# Patient Record
Sex: MALE | Race: WHITE | NOT HISPANIC OR LATINO | Employment: FULL TIME | URBAN - METROPOLITAN AREA
[De-identification: names, ages, dates, MRNs, and addresses within clinical notes are randomized per-mention and may not be internally consistent; named-entity substitution may affect disease eponyms.]

---

## 2018-03-23 ENCOUNTER — OFFICE VISIT (OUTPATIENT)
Dept: INTERNAL MEDICINE | Facility: CLINIC | Age: 33
End: 2018-03-23
Payer: COMMERCIAL

## 2018-03-23 VITALS
OXYGEN SATURATION: 95 % | BODY MASS INDEX: 35.34 KG/M2 | WEIGHT: 260.9 LBS | HEART RATE: 59 BPM | SYSTOLIC BLOOD PRESSURE: 141 MMHG | DIASTOLIC BLOOD PRESSURE: 84 MMHG | HEIGHT: 72 IN

## 2018-03-23 DIAGNOSIS — Z82.49 FAMILY HISTORY OF ISCHEMIC HEART DISEASE: ICD-10-CM

## 2018-03-23 DIAGNOSIS — F33.1 MAJOR DEPRESSIVE DISORDER, RECURRENT EPISODE, MODERATE (H): ICD-10-CM

## 2018-03-23 DIAGNOSIS — F41.1 GENERALIZED ANXIETY DISORDER: Primary | ICD-10-CM

## 2018-03-23 DIAGNOSIS — Z82.49 FAMILY HISTORY OF MYOCARDIAL INFARCTION AT AGE LESS THAN 60: ICD-10-CM

## 2018-03-23 LAB
CHOLEST SERPL-MCNC: 187 MG/DL
CRP SERPL HS-MCNC: 11.7 MG/L
HDLC SERPL-MCNC: 32 MG/DL
INTERPRETATION ECG - MUSE: NORMAL
LDLC SERPL CALC-MCNC: 109 MG/DL
NONHDLC SERPL-MCNC: 155 MG/DL
TRIGL SERPL-MCNC: 231 MG/DL

## 2018-03-23 RX ORDER — CITALOPRAM HYDROBROMIDE 20 MG/1
TABLET ORAL
Qty: 30 TABLET | Refills: 3 | Status: SHIPPED | OUTPATIENT
Start: 2018-03-23 | End: 2018-07-24

## 2018-03-23 ASSESSMENT — ANXIETY QUESTIONNAIRES
1. FEELING NERVOUS, ANXIOUS, OR ON EDGE: NEARLY EVERY DAY
IF YOU CHECKED OFF ANY PROBLEMS ON THIS QUESTIONNAIRE, HOW DIFFICULT HAVE THESE PROBLEMS MADE IT FOR YOU TO DO YOUR WORK, TAKE CARE OF THINGS AT HOME, OR GET ALONG WITH OTHER PEOPLE: VERY DIFFICULT
2. NOT BEING ABLE TO STOP OR CONTROL WORRYING: MORE THAN HALF THE DAYS
7. FEELING AFRAID AS IF SOMETHING AWFUL MIGHT HAPPEN: NEARLY EVERY DAY
3. WORRYING TOO MUCH ABOUT DIFFERENT THINGS: MORE THAN HALF THE DAYS
6. BECOMING EASILY ANNOYED OR IRRITABLE: MORE THAN HALF THE DAYS
5. BEING SO RESTLESS THAT IT IS HARD TO SIT STILL: MORE THAN HALF THE DAYS
GAD7 TOTAL SCORE: 17

## 2018-03-23 ASSESSMENT — PATIENT HEALTH QUESTIONNAIRE - PHQ9: 5. POOR APPETITE OR OVEREATING: NEARLY EVERY DAY

## 2018-03-23 ASSESSMENT — PAIN SCALES - GENERAL: PAINLEVEL: NO PAIN (0)

## 2018-03-23 NOTE — MR AVS SNAPSHOT
After Visit Summary   3/23/2018    Valdez Wheeler    MRN: [id number]           Patient Information     Date Of Birth          1985        Visit Information        Provider Department      3/23/2018 7:00 AM Supa Platt MD [facility]        Today's Diagnoses     Generalized anxiety disorder    -  1    Family history of ischemic heart disease        Family history of myocardial infarction at age less than 60        Major depressive disorder, recurrent episode, moderate (H)          Care Instructions    Primary Care Center: [phone]     Primary Care Center Medication Refill Request Information:  * Please contact your pharmacy regarding ANY request for medication refills.  ** [facility] Prescription Fax = [phone]  * Please allow 3 business days for routine medication refills.  * Please allow 5 business days for controlled substance medication refills.     Primary Care Center Test Result notification information:  *You will be notified with in 7-10 days of your appointment day regarding the results of your test.  If you are on MyChart you will be notified as soon as the provider has reviewed the results and signed off on them.          Follow-ups after your visit        Follow-up notes from your care team     Return in about 1 month (around 4/23/2018).      Your next 10 appointments already scheduled     Mar 23, 2018  7:45 AM CDT   LAB with  LAB   [facility] ([facility])    [address] [place] [id number] [phone]           Please do not eat 10-12 hours before your appointment if you are coming in fasting for labs on lipids, cholesterol, or glucose (sugar). This does not apply to pregnant women. Water, hot tea and black coffee (with nothing added) are okay. Do not drink other fluids, diet soda or chew gum.              Future tests that were ordered for you today     Open Future Orders        Priority Expected Expires  Ordered    Lipid panel reflex to direct LDL Fasting Routine 3/23/2018 3/23/2019 3/23/2018    CRP cardiac risk Routine 3/23/2018 3/23/2019 3/23/2018            Who to contact     Please call your clinic at [phone] to:    Ask questions about your health    Make or cancel appointments    Discuss your medicines    Learn about your test results    Speak to your doctor            Additional Information About Your Visit        MyChart Information     Brenco is an electronic gateway that provides easy, online access to your medical records. With Brenco, you can request a clinic appointment, read your test results, renew a prescription or communicate with your care team.     To sign up for Brenco visit the website at [email or web address]   You will be asked to enter the access code listed below, as well as some personal information. Please follow the directions to create your username and password.     Your access code is: [email or web address]  Expires: 2018  6:30 AM     Your access code will  in 90 days. If you need help or a new code, please contact your [facility] or call [phone] for assistance.        Care EveryWhere ID     This is your Care EveryWhere ID. This could be used by other organizations to access your [place] medical records  [id number]        Your Vitals Were     Pulse Height Pulse Oximetry BMI (Body Mass Index)          59 1.829 m (6') 95% 35.38 kg/m2         Blood Pressure from Last 3 Encounters:   18 141/84   16 134/90   10/29/15 (!) 135/92    Weight from Last 3 Encounters:   18 118.3 kg (260 lb 14.4 oz)   16 114.5 kg (252 lb 6.4 oz)   10/29/15 113.4 kg (250 lb)              We Performed the Following     EKG Performed in Clinic w/ Provider Reading Fee          Today's Medication Changes          These changes are accurate as of 3/23/18  7:41 AM.  If you have any questions, ask your nurse or doctor.               Start taking  these medicines.        Dose/Directions    citalopram 20 MG tablet   Commonly known as:  celeXA   Used for:  Generalized anxiety disorder, Major depressive disorder, recurrent episode, moderate (H)   Started by:  Supa Platt MD        Take 1/2 tablet (10mg) daily for 7 days and then increase to 1 tablet (20mg)   Quantity:  30 tablet   Refills:  3            Where to get your medicines      These medications were sent to [facility]/pharmacy #[id number] - [place], [place] - [address]  [address] [facility] [place] [address]     Phone:  [phone]     citalopram 20 MG tablet                Primary Care Provider Office Phone # Fax #    Miguel Ángel Martinez [phone]       [address]  [facility] [address]        Equal Access to Services     [facility] : Hadii bhupinder deal Sobrandy, waaxda luqadaha, qaybta kaalmada adehuy, aroldo valencia . So [facility] [phone].    ATENCIÓN: Si habla español, tiene a conklin disposición servicios gratuitos de asistencia lingüística. [facility] [phone].    We comply with applicable federal civil rights laws and [place] laws. We do not discriminate on the basis of race, color, national origin, age, disability, sex, sexual orientation, or gender identity.            Thank you!     Thank you for choosing [facility]  for your care. Our goal is always to provide you with excellent care. Hearing back from our patients is one way we can continue to improve our services. Please take a few minutes to complete the written survey that you may receive in the mail after your visit with us. Thank you!             Your Updated Medication List - Protect others around you: Learn how to safely use, store and throw away your medicines at www.disposemymeds.org.          This list is accurate as of 3/23/18  7:41 AM.  Always use your most recent med list.                   Brand Name Dispense Instructions for use Diagnosis     citalopram 20 MG tablet    celeXA    30 tablet    Take 1/2 tablet (10mg) daily for 7 days and then increase to 1 tablet (20mg)    Generalized anxiety disorder, Major depressive disorder, recurrent episode, moderate (H)       sertraline 50 MG tablet    ZOLOFT    30 tablet    Take 1.5 tablets (75 mg) by mouth daily    Recurrent major depressive disorder, remission status unspecified (H)

## 2018-03-23 NOTE — PROGRESS NOTES
Rooming Note  Health Maintenance   There are no preventive care reminders to display for this patient. All health maintenance items UP TO DATE    Amy Van LPN at 7:10 AM on 3/23/2018.

## 2018-03-23 NOTE — PROGRESS NOTES
ASSESSMENT/PLAN:  Valdez was seen today for anxiety.    Diagnoses and all orders for this visit:    Generalized anxiety disorder and Major depressive disorder, recurrent episode, moderate (H)  -     citalopram (CELEXA) 20 MG tablet; Take 1/2 tablet (10mg) daily for 7 days and then increase to 1 tablet (20mg)    Family history of ischemic heart disease and Family history of myocardial infarction at age less than 60  -     EKG Performed in Clinic w/ Provider Reading Fee - shows bradycardia but otherwise normal.  I will look for lipid and CRP to see if we need aspirin and/or statin  -     Lipid panel reflex to direct LDL Fasting; Future  -     CRP cardiac risk; Future    Supa Platt MD, FACP      Chief complaint:  Valdez Wheeler is a 33 year old male presents for   Chief Complaint   Patient presents with     Anxiety     Patient is here to discuss increased anxiety started about 2 months ago after the death of his father.         SUBJECTIVE:  For years he has had anxiety issues.  Some was job related which was a stressful job.  He changed jobs which helped.  His dad  2 months ago from a MI at 59yr.  His grandfather  at 37yr from an aneurysm (more is not known).  The father had hardening of the arteries and 99% blockage of an artery according to the 's report.  He had also lost a lot of weight quickly before death which was never really figured out.  He occasionally gets chest pain - sometimes in the middle of the night, sometimes during the day.    The patient has had some difficulty sleeping and had racing heart.  He has had insomnia in the past.  This time there was a heart component which worried him.  Now the anxiety has caused him to have to get up and move around at work.  He had been on anti-depressants in the past but says that was seasonal.  He has tried counseling and uses some of this when he can.    The same day his dad , he found out that his wife is pregnant.    Medications and allergies  were reviewed by me today.     Review Of Systems  Cardiovascular: no chest pain, palpitations, or pain with walking, no orthopnea or PND   Respiratory: no dyspnea, cough, shortness of breath or wheezing   GI: no nausea, vomiting, diarrhea or constipation, no abdominal pain       Patient Active Problem List    Diagnosis Date Noted     Major depressive disorder, recurrent episode, moderate (H) 03/23/2018     Priority: Medium     Generalized anxiety disorder 03/23/2018     Priority: Medium       PHYSICAL EXAM:  /84  Pulse 59  Ht 1.829 m (6')  Wt 118.3 kg (260 lb 14.4 oz)  SpO2 95%  BMI 35.38 kg/m2  Constitutional: no distress, comfortable, pleasant   Cardiovascular: regular rate and rhythm, normal S1 and S2, no murmurs  Respiratory: clear to auscultation, no wheezes or crackles, normal breath sounds   Psychological: appropriate mood     PHQ9 = 17  ERICA = 17      EKG read by me: rate of 49, normal intervals, no ST-T changes, regular

## 2018-03-23 NOTE — NURSING NOTE
Chief Complaint   Patient presents with     Anxiety     Patient is here to discuss increased anxiety started about 2 months ago after the death of his father.      Amy Van LPN at 7:04 AM on 3/23/2018.

## 2018-03-23 NOTE — PATIENT INSTRUCTIONS
[facility]: [phone]     [facility] Medication Refill Request Information:  * Please contact your pharmacy regarding ANY request for medication refills.  ** [facility] Prescription Fax = [phone]  * Please allow 3 business days for routine medication refills.  * Please allow 5 business days for controlled substance medication refills.     [facility] Test Result notification information:  *You will be notified with in 7-10 days of your appointment day regarding the results of your test.  If you are on MyChart you will be notified as soon as the provider has reviewed the results and signed off on them.

## 2018-03-24 ASSESSMENT — ANXIETY QUESTIONNAIRES: GAD7 TOTAL SCORE: 17

## 2018-03-24 ASSESSMENT — PATIENT HEALTH QUESTIONNAIRE - PHQ9: SUM OF ALL RESPONSES TO PHQ QUESTIONS 1-9: 17

## 2018-04-20 ENCOUNTER — DOCUMENTATION ONLY (OUTPATIENT)
Dept: MATERNAL FETAL MEDICINE | Facility: CLINIC | Age: 33
End: 2018-04-20

## 2018-04-20 DIAGNOSIS — Z31.440 ENCOUNTER OF MALE FOR TESTING FOR GENETIC DISEASE CARRIER STATUS FOR PROCREATIVE MANAGEMENT: Primary | ICD-10-CM

## 2018-04-20 NOTE — PROGRESS NOTES
Valdez was seen in conjuncture with his wife (MRN: [id number]) for genetic counseling today.  As a part of that appointment, the option of Sheldon Carrier screening was discussed.  The couple indicated that they would each like to proceed with this screening through [facility] (which includes carrier screening for 175 autosomal recessive conditions and fragile X carrier screening for females).  We discussed the details, limitations, and possible outcomes of this screening.  We also discussed that it is screening that may or may not be covered by insurance and may be subject to the Valdez's deductible/co-insurance.  After our discussion, Valdez indicated that he would like to proceed with testing.  his blood was drawn and sent to [facility].  Results are expected in ~14 days, at which time I will contact the couple directly.  Valdez signed consent to review his results with his wife.  A copy of the report will be scanned into Epic.    Ericka Mclean MS [facility]  Certified Genetic Counselor  Maternal Fetal Medicine  [facility]  Voice Mail:  [phone]  E-Mail:  [email or web address]  Pager:  [phone]

## 2018-05-07 ENCOUNTER — TELEPHONE (OUTPATIENT)
Dept: MATERNAL FETAL MEDICINE | Facility: CLINIC | Age: 33
End: 2018-05-07

## 2018-05-07 DIAGNOSIS — Z31.440 ENCOUNTER OF MALE FOR TESTING FOR GENETIC DISEASE CARRIER STATUS FOR PROCREATIVE MANAGEMENT: Primary | ICD-10-CM

## 2018-05-07 NOTE — TELEPHONE ENCOUNTER
Received call notifying that Valdez would like carrier screening for CF and SMA through [facility] and currently declines expanded carrier screening through [facility].  Orders updated.  Blood to be drawn on 5/10/18 following wife's LII u/s appnt.  Results expected ~14 days from that date.    Ericka Mclean MS [facility]  Certified Genetic Counselor  Maternal Fetal Medicine  [facility]  Voice Mail:  [phone]  E-Mail:  [email or web address]  Pager:  [phone]

## 2018-05-10 DIAGNOSIS — Z31.440 ENCOUNTER OF MALE FOR TESTING FOR GENETIC DISEASE CARRIER STATUS FOR PROCREATIVE MANAGEMENT: ICD-10-CM

## 2018-05-10 LAB
MISCELLANEOUS TEST: NORMAL
MISCELLANEOUS TEST: NORMAL

## 2018-05-10 PROCEDURE — 36415 COLL VENOUS BLD VENIPUNCTURE: CPT | Performed by: GENETIC COUNSELOR, MS

## 2018-05-10 PROCEDURE — 81401 MOPATH PROCEDURE LEVEL 2: CPT | Performed by: GENETIC COUNSELOR, MS

## 2018-05-10 PROCEDURE — 84999 UNLISTED CHEMISTRY PROCEDURE: CPT | Performed by: GENETIC COUNSELOR, MS

## 2018-05-10 PROCEDURE — 81220 CFTR GENE COM VARIANTS: CPT | Performed by: GENETIC COUNSELOR, MS

## 2018-05-22 ENCOUNTER — DOCUMENTATION ONLY (OUTPATIENT)
Dept: MATERNAL FETAL MEDICINE | Facility: CLINIC | Age: 33
End: 2018-05-22

## 2018-05-22 ENCOUNTER — TELEPHONE (OUTPATIENT)
Dept: MATERNAL FETAL MEDICINE | Facility: CLINIC | Age: 33
End: 2018-05-22

## 2018-05-22 LAB
LAB SCANNED RESULT: NORMAL
LAB SCANNED RESULT: NORMAL

## 2018-05-22 NOTE — PROGRESS NOTES
"Called patient's partner, Mavis \"Paloma\" Liam, and reported her negative carrier screening results for cystic fibrosis, spinal muscular atrophy, and fragile X syndrome. Valdez also had negative cystic fibrosis and spinal muscular atrophy carrier screening results. We discussed that these results significantly reduce (but does not eliminate) the risk to have a child with one of these conditions. Paloma verbalized understanding of the limitations of testing.    Silvina Reno MS, [facility]  Maternal Fetal Medicine  Phone:[phone]  "

## 2018-05-22 NOTE — TELEPHONE ENCOUNTER
5/22/2018    Valdze had carrier screening for cystic fibrosis and spinal muscular atrophy to clarify risks for his wife's ongoing pregnancy.  Valdez's result are negative and were reviewed with his wife Mavis as discussed at her genetic counseling appointment.  There is a consent to communicate on file.  Valdez's negative results greatly reduce the risks for his wife's pregnancy to be affected with either of these conditions.      Rhett Andrea MS, [facility]  Licensed Genetic Counselor  Phone: [phone]  Pager: [phone]

## 2018-07-24 DIAGNOSIS — F33.1 MAJOR DEPRESSIVE DISORDER, RECURRENT EPISODE, MODERATE (H): ICD-10-CM

## 2018-07-24 DIAGNOSIS — F41.1 GENERALIZED ANXIETY DISORDER: ICD-10-CM

## 2018-07-24 RX ORDER — CITALOPRAM HYDROBROMIDE 20 MG/1
20 TABLET ORAL DAILY
Qty: 30 TABLET | Refills: 0 | Status: SHIPPED | OUTPATIENT
Start: 2018-07-24 | End: 2018-08-26

## 2018-08-26 DIAGNOSIS — F33.1 MAJOR DEPRESSIVE DISORDER, RECURRENT EPISODE, MODERATE (H): ICD-10-CM

## 2018-08-26 DIAGNOSIS — F41.1 GENERALIZED ANXIETY DISORDER: ICD-10-CM

## 2018-08-28 ENCOUNTER — TELEPHONE (OUTPATIENT)
Dept: INTERNAL MEDICINE | Facility: CLINIC | Age: 33
End: 2018-08-28

## 2018-08-28 RX ORDER — CITALOPRAM HYDROBROMIDE 20 MG/1
20 TABLET ORAL DAILY
Qty: 30 TABLET | Refills: 0 | Status: SHIPPED | OUTPATIENT
Start: 2018-08-28 | End: 2018-09-27

## 2018-08-28 NOTE — TELEPHONE ENCOUNTER
----- Message from Kathleen M Doege, RN sent at 8/28/2018  2:07 PM CDT -----  Regarding: pt needs appointment  Please call to schedule.    Patient is overdue for for follow up visit for new medications started in March 2018 - was told to RTC in 1 month (April/May)    Thanks    I do not need any follow up on the scheduling of this appointment unless the patient will no longer be receiving care in our clinic.

## 2018-08-28 NOTE — TELEPHONE ENCOUNTER
Called patient and left a message to call back to schedule follow up appointment Primary Care Clinic from his last visit in March 2018.

## 2018-08-28 NOTE — TELEPHONE ENCOUNTER
Patient was started on Citalopram on 3-23-18 with a PHQ-9 of 17 and due to RTC in one month.    Per protocol when starting a new serotonin specific reuptake inhibitor pt is to have follow up with in 1-2 months - not follow up visit since that time.    30 day supply sent to the pharmacy and FYI sent to the clinic RN.    Scheduling has been notified to contact the pt for appointment.      Kathleen M Doege RN

## 2018-11-05 DIAGNOSIS — F41.1 GENERALIZED ANXIETY DISORDER: ICD-10-CM

## 2018-11-05 DIAGNOSIS — F33.1 MAJOR DEPRESSIVE DISORDER, RECURRENT EPISODE, MODERATE (H): ICD-10-CM

## 2018-11-06 NOTE — TELEPHONE ENCOUNTER
citalopram (CELEXA) 20 MG tablet  Last Written Prescription Date:  9/28/18  Last Fill Quantity: 30,   # refills: 0  Last Office Visit : 3/23/18  Future Office visit:  None scheduled    Routing refill request to RN for review/approval because:  Failed protocol  Over due for appointment per protocol + per last clinic note RTC 1 month. Overdue for PHQ (Last PHQ-9 was 17, GAD7 was 17)      Message sent to Clinic Coordinators to call and assist in setting up appointment.

## 2018-11-08 RX ORDER — CITALOPRAM HYDROBROMIDE 20 MG/1
20 TABLET ORAL DAILY
Qty: 30 TABLET | Refills: 0 | Status: SHIPPED | OUTPATIENT
Start: 2018-11-08 | End: 2018-12-10

## 2018-12-10 ENCOUNTER — OFFICE VISIT (OUTPATIENT)
Dept: INTERNAL MEDICINE | Facility: CLINIC | Age: 33
End: 2018-12-10
Payer: COMMERCIAL

## 2018-12-10 VITALS
DIASTOLIC BLOOD PRESSURE: 87 MMHG | SYSTOLIC BLOOD PRESSURE: 134 MMHG | BODY MASS INDEX: 36.37 KG/M2 | WEIGHT: 268.2 LBS | HEART RATE: 71 BPM

## 2018-12-10 DIAGNOSIS — F33.1 MAJOR DEPRESSIVE DISORDER, RECURRENT EPISODE, MODERATE (H): ICD-10-CM

## 2018-12-10 DIAGNOSIS — R06.83 SNORES: Primary | ICD-10-CM

## 2018-12-10 DIAGNOSIS — F41.1 GENERALIZED ANXIETY DISORDER: ICD-10-CM

## 2018-12-10 DIAGNOSIS — G47.10 EXCESSIVE SLEEPINESS: ICD-10-CM

## 2018-12-10 RX ORDER — CITALOPRAM HYDROBROMIDE 20 MG/1
20 TABLET ORAL DAILY
Qty: 90 TABLET | Refills: 3 | Status: SHIPPED | OUTPATIENT
Start: 2018-12-10 | End: 2019-12-11

## 2018-12-10 ASSESSMENT — PAIN SCALES - GENERAL: PAINLEVEL: NO PAIN (0)

## 2018-12-10 NOTE — PROGRESS NOTES
ASSESSMENT/PLAN:  Valdez was seen today for refill request.    Diagnoses and all orders for this visit:    Abdominal pain - mostly likely given the symptoms is irritable bowel syndrome but it would be better to see him during an episode to be able to exclude other things.    Snores and Excessive sleepiness  -     SLEEP EVALUATION & MANAGEMENT REFERRAL - ADULT -[facility] [place] [facility]  [phone] (Age 2 and up)    Generalized anxiety disorder  -     citalopram (CELEXA) 20 MG tablet; Take 1 tablet (20 mg) by mouth daily    Major depressive disorder, recurrent episode, moderate (H)  -     citalopram (CELEXA) 20 MG tablet; Take 1 tablet (20 mg) by mouth daily    Total time spent 30 minutes.  More than 50% of the time spent with Mr. Wheeler on counseling / coordinating his care    Supa Platt MD, FACP      Chief complaint:  Valdez Wheeler is a 33 year old male presents for   Chief Complaint   Patient presents with     Refill Request     patient states he is here for a follow up        SUBJECTIVE:  He is feeling good in general.    Needs refills of mood meds.  He has some alterations with a new baby.  Overall he says he feels better though from the beginning of the year when he had some family deaths.    3-4 weeks he had some abdominal issues which are going away now.  There was pain and he was going to the bathroom a lot.  He cut back on coffee but no change.  It came out of the blue and went away on its own.  He points to the center of the abdomen and says it was a discomfort.  He was having a lot of loose and diarrhea stools.  He had to drink a lot of water to stay hydrated.  No change in diet.  No past history of this in the past but has had some stomach issues off and on in the past.  He has added a fiber supplement which may have helped.    He has been snoring a lot recently.  He has a 3 month old baby.  When he is more tired he will snore more loudly so thinks the baby is altering  "his sleep.  He wakes up very tired.  No noted apneic spells but his wife sleeps \"like a rock\".  He has a video that his wife took.  He is very tired during the day and may nap if he is home.    Medications and allergies were reviewed by me today.     Review Of Systems  Constitutional: no fevers  GI: no blood    Social History     Tobacco Use     Smoking status: Never Smoker     Smokeless tobacco: Never Used   Substance Use Topics     Alcohol use: Not on file     Drug use: Not on file     Family History   Problem Relation Age of Onset     Myocardial Infarction Father          Patient Active Problem List    Diagnosis Date Noted     Major depressive disorder, recurrent episode, moderate (H) 03/23/2018     Priority: Medium     Generalized anxiety disorder 03/23/2018     Priority: Medium       PHYSICAL EXAM:  /87   Pulse 71   Wt 121.7 kg (268 lb 3.2 oz)   BMI 36.37 kg/m    Constitutional: no distress, comfortable, pleasant   Cardiovascular: regular rate and rhythm, normal S1 and S2, no murmurs, rubs or gallops, peripheral pulses full and symmetric   Respiratory: clear to auscultation, no wheezes or crackles, normal breath sounds   Gastrointestinal: positive bowel sounds, nontender - mildly nontender in the abdomen, no hepatosplenomegaly, no masses               "

## 2018-12-10 NOTE — PATIENT INSTRUCTIONS
[facility] Medication Refill Request Information:  * Please contact your pharmacy regarding ANY request for medication refills.  ** [facility] Prescription Fax = [phone]  * Please allow 3 business days for routine medication refills.  * Please allow 5 business days for controlled substance medication refills.     [facility] Test Result notification information:  *You will be notified with in 7-10 days of your appointment day regarding the results of your test.  If you are on MyChart you will be notified as soon as the provider has reviewed the results and signed off on them.    [facility]: [phone]

## 2018-12-10 NOTE — NURSING NOTE
Chief Complaint   Patient presents with     Refill Request     patient states he is here for a follow up     DARREN MARISCAL at 7:54 AM on 12/10/2018.

## 2019-12-11 ENCOUNTER — TELEPHONE (OUTPATIENT)
Dept: INTERNAL MEDICINE | Facility: CLINIC | Age: 34
End: 2019-12-11

## 2019-12-11 DIAGNOSIS — F33.1 MAJOR DEPRESSIVE DISORDER, RECURRENT EPISODE, MODERATE (H): ICD-10-CM

## 2019-12-11 DIAGNOSIS — F41.1 GENERALIZED ANXIETY DISORDER: ICD-10-CM

## 2019-12-11 RX ORDER — CITALOPRAM HYDROBROMIDE 20 MG/1
20 TABLET ORAL DAILY
Qty: 90 TABLET | Refills: 0 | Status: SHIPPED | OUTPATIENT
Start: 2019-12-11 | End: 2020-03-24

## 2019-12-11 NOTE — TELEPHONE ENCOUNTER
CITALOPRAM HBR 20 MG TABLET   Last Written Prescription Date:  12/10/2018  Last Fill Quantity: 90,   # refills: 3  Last Office Visit : 12/10/2018  Future Office visit:  None    Routing refill request to provider for review/approval because:  Office Visit due,   PHQ-9 Due             90 Day Carolyn sent to pharm 12/11/2019.  Aggie Wilkerson CMA Notified    Jeanna Landaverde RN  Central Triage Red Flags/Med Refills

## 2019-12-15 DIAGNOSIS — F41.1 GENERALIZED ANXIETY DISORDER: ICD-10-CM

## 2019-12-15 DIAGNOSIS — F33.1 MAJOR DEPRESSIVE DISORDER, RECURRENT EPISODE, MODERATE (H): ICD-10-CM

## 2019-12-18 RX ORDER — CITALOPRAM HYDROBROMIDE 20 MG/1
TABLET ORAL
Qty: 90 TABLET | Refills: 0 | OUTPATIENT
Start: 2019-12-18

## 2020-03-10 ENCOUNTER — HEALTH MAINTENANCE LETTER (OUTPATIENT)
Age: 35
End: 2020-03-10

## 2020-06-24 DIAGNOSIS — F41.1 GENERALIZED ANXIETY DISORDER: ICD-10-CM

## 2020-06-24 DIAGNOSIS — F33.1 MAJOR DEPRESSIVE DISORDER, RECURRENT EPISODE, MODERATE (H): ICD-10-CM

## 2020-06-26 NOTE — TELEPHONE ENCOUNTER
Patient called and left message to call back to schedule follow up for medications refills in Primary Care Clinic as last visit with Dr. Platt was in December 2018.    Pt called and message left that a 30 day supply was sent to his pharmacy and that he will need to schedule an appt with Dr Platt for additional refills. Clinic numbers given for questions or concerns.  Kristine Manzanares RN 9:53 AM on 7/1/2020.

## 2020-06-26 NOTE — TELEPHONE ENCOUNTER
----- Message from Dulce Silva RN sent at 6/26/2020 11:37 AM CDT -----  Regarding: Dr Platt appt due  Please call to schedule.    Thanks    I do not need any follow up on the scheduling of this appointment unless the patient will no longer be receiving care in our clinic.

## 2020-06-26 NOTE — TELEPHONE ENCOUNTER
Medication requested: CITALOPRAM HBR 20 MG TABLET    Last refilled: 3/24/20  Qty: 90/0  Last seen: 12/10/2018    Next appt: none. Scheduling has been notified to contact the pt for appointment.    Routing refill request to provider for review/approval because:  REPEAT  OVER DUE PHQ 9 =17 on    3/23/18  AND OVER DUE RTC, last visit>18 months

## 2020-06-30 RX ORDER — CITALOPRAM HYDROBROMIDE 20 MG/1
TABLET ORAL
Qty: 30 TABLET | Refills: 0 | Status: SHIPPED | OUTPATIENT
Start: 2020-06-30 | End: 2021-08-17

## 2020-12-20 ENCOUNTER — HEALTH MAINTENANCE LETTER (OUTPATIENT)
Age: 35
End: 2020-12-20

## 2021-04-24 ENCOUNTER — HEALTH MAINTENANCE LETTER (OUTPATIENT)
Age: 36
End: 2021-04-24

## 2021-08-17 ENCOUNTER — OFFICE VISIT (OUTPATIENT)
Dept: INTERNAL MEDICINE | Facility: CLINIC | Age: 36
End: 2021-08-17
Payer: COMMERCIAL

## 2021-08-17 VITALS
SYSTOLIC BLOOD PRESSURE: 130 MMHG | RESPIRATION RATE: 18 BRPM | HEART RATE: 79 BPM | DIASTOLIC BLOOD PRESSURE: 76 MMHG | OXYGEN SATURATION: 96 % | WEIGHT: 253.6 LBS | BODY MASS INDEX: 34.39 KG/M2

## 2021-08-17 DIAGNOSIS — S46.912A SHOULDER STRAIN, LEFT, INITIAL ENCOUNTER: Primary | ICD-10-CM

## 2021-08-17 PROCEDURE — 99213 OFFICE O/P EST LOW 20 MIN: CPT | Performed by: PHYSICIAN ASSISTANT

## 2021-08-17 NOTE — PROGRESS NOTES
Assessment & Plan     Shoulder strain, left, initial encounter  Patient was not able to go to PT as the referral was for [facility] and not covered on insurance  Referral done for [facility].  Patient would like to start with PT first and see if improvement  Reviewed if not improving to see orthopedics and eval for rotator cuff tear    - [facility] Referral; Future  - Orthopedic  Referral; Future                 Return in about 6 months (around 2/17/2022) for Physical Exam, regular primary provider.    Daylin Fonseca PA-C  [facility]    Nayan Kellogg is a 36 year old who presents for the following health issues     HPI     ED/UC Followup:    Facility:  [facility]  Date of visit: 07/30/2021  Reason for visit: left shoulder injury  Current Status: Still hurting and needs new referral to PT   Per ER visit care everywhere  Left shoulder xray normal , no fractures or dislocations, as read by this provider with agreement from radiology.    No results found for this visit on 07/30/21.    ICD-10-CM   1. Left shoulder strain, initial encounter S46.912A AMB CONSULT TO PHYSICAL THERAPY     Ibuprofen, sling, Ice, Physical therapy. See Orthopedics if not improving.      Initial injury happened with slip on stairs and tired to catch himself from falling.   Felt pain in the left posterior shoulder  Left handed.  Still with pain about 2/10 all the time and increased if trying to lift         Review of Systems   Constitutional, HEENT, cardiovascular, pulmonary, gi and gu systems are negative, except as otherwise noted.      Objective    /76   Pulse 79   Resp 18   Wt 115 kg (253 lb 9.6 oz)   SpO2 96%   BMI 34.39 kg/m    Body mass index is 34.39 kg/m .  Physical Exam   GENERAL: healthy, alert and no distress  RESP: lungs clear to auscultation - no rales, rhonchi or wheezes  CV: regular rates and rhythm and normal S1 S2, no S3 or S4  MS: left  shoulder  Tenderness posterior lower scapular area.   Increase pain at the top of flexion arch and some with abduction and external rotation   SKIN: no suspicious lesions or rashes

## 2021-08-30 ENCOUNTER — THERAPY VISIT (OUTPATIENT)
Dept: PHYSICAL THERAPY | Facility: CLINIC | Age: 36
End: 2021-08-30
Attending: PHYSICIAN ASSISTANT
Payer: COMMERCIAL

## 2021-08-30 DIAGNOSIS — S46.912A SHOULDER STRAIN, LEFT, INITIAL ENCOUNTER: ICD-10-CM

## 2021-08-30 DIAGNOSIS — M25.512 LEFT SHOULDER PAIN: ICD-10-CM

## 2021-08-30 PROCEDURE — 97110 THERAPEUTIC EXERCISES: CPT | Mod: GP | Performed by: PHYSICAL THERAPIST

## 2021-08-30 PROCEDURE — 97161 PT EVAL LOW COMPLEX 20 MIN: CPT | Mod: GP | Performed by: PHYSICAL THERAPIST

## 2021-08-30 NOTE — PROGRESS NOTES
[facility]: Physical Therapy Initial Evaluation   Aug 30, 2021    Subjective:   Chief Complaint: Left Shoulder Pain   Pain: outside of the left shoulder and across the shoulder blade/back   Numbness/Tingling: None   Weakness: None   Stiffness: None   Other: Popping in a way it didn't pop before  New/Recurrent/Chronic: New  DOI/onset: 7/30/2021   Referral Date: 8/17/2021 - Daylin Fnoseca PA-C  Mechanism of onset: slip on stairs, fell forward going down the stairs and caught the hand rail  PMH/surgical history/trauma:    - ERICA/MDD   - Overweight  General health as reported by patient: Good  Medications: tylenol when really bad  Occupation:  Job duties: computer work, prolonged sitting, prolonged standing,   Previous Treatment (Effect): Tylenol (just helpful enough to get to sleep)   Imaging: Per ER visit care everywhere: Left shoulder xray normal , no fractures or dislocations, as read by this provider with agreement from radiology.  AM/PM: constant 1/10 with occasional spikes  Quality of Pain: sharp,   Pain: 1/10 at present, 1/10 at best, 6/10 at worst  Worse: reaching out to the side/overhead, lifting,   Better: hold the arm in close to the body, ice  Progression of Symptoms since onset: getting better  Sleeping: prefers to sleep on his left side, will wake him up if he does this; sleeps on his back now. Does not wake him every night, can fall back asleep most of the time.   Current Functional Status: SEE GOALS FLOWSHEET  - reaching - pain with reaching out to the side with trigger sharp pain   - lifting - lifting son will hurt (25 LBs or so)  - driving - mostly steers with his right arm  Previous Functional Status: no restrictions  Current HEP/exercise regimen: walking, peloton,   Hand/Leg Dominance: Left Handed  Transportation to Therapy: Independent with transportation  Live with Others: son (almost 3 years old), wife, 1 dog    Patient's goal(s): Get some idea of the  exercises I need to do. Make sure that it's healing/healed.       Objective:    Standing Posture: mild forward head posture    Cervical Screen: reports neck issues preceding shoulder injury    Scapular Positioning: elevation of the left scapula    Shoulder: (* indicates patient's pain)   PROM R PROM L AROM R AROM L   Flex/  Elevation  approximately equal to AROM 163 134*   Abd   Full (~180) 141*   ER   80 80   IR/Ext   L1 L1*     Scapulothoracic Rhythm: mild abnormality on the way up, normal on the descent    Palpation: no tenderness to palpation    Special tests:   R L   Impingement     Neer's - + mild   Hawkin's-Patrick - + mild     Instability     Posterior Load & Shift - +   Biceps      Speed's - -   Rotator Cuff Tear     Drop Arm  Had a pop, not really pain     GH/Capsular Mobility  R L   Posterior glide WNL WNL*   Inferior glide WNL WNL*               Therapist Impression/Differential Diagnosis:   Valdez presents to physical therapy with a primary complaint of left shoulder pain after a fall on the stairs. Clinical findings include positive impingement tests, negative cuff tear tests, mild ROM limitations, and tension in the region of the left neck. Suspect a strain of the supraspinatus. Rehabilitation will focus on allowing adequate healing of the injury muscletendon over the next couple of weeks and progress to loading as tolerated all while maintaining ROM.          Assessment/Plan:    Patient is a 36 year old male with left side shoulder complaints.    Patient has the following significant findings with corresponding treatment plan.                Referring Diagnosis: Shoulder strain, left    Pain -  hot/cold therapy, manual therapy, splint/taping/bracing/orthotics, self management, education and home program  Decreased ROM/flexibility - manual therapy, therapeutic exercise, therapeutic activity and home program  Decreased function - therapeutic activities and home program  Impaired posture - neuro  re-education, therapeutic activities and home program        Therapy Evaluation Codes:   1) History comprised of:   Personal factors that impact the plan of care:      Living environment.    Comorbidity factors that impact the plan of care are:      None.     Medications impacting care: None.  2) Examination of Body Systems comprised of:   Body structures and functions that impact the plan of care:      Cervical spine, Shoulder and Thoracic Spine.   Activity limitations that impact the plan of care are:      Driving, Lifting, Sleeping and Reaching.  3) Clinical presentation characteristics are:   Evolving/Changing.  4) Decision-Making    Low complexity using standardized patient assessment instrument and/or measureable assessment of functional outcome.  Cumulative Therapy Evaluation is: Low complexity.    Previous and current functional limitations:  (See Goal Flow Sheet for this information)    Short term and Long term goals: (See Goal Flow Sheet for this information)     Communication ability:  Patient appears to be able to clearly communicate and understand verbal and written communication and follow directions correctly.  Treatment Explanation - The following has been discussed with the patient:   RX ordered/plan of care  Anticipated outcomes  Possible risks and side effects  This patient would benefit from PT intervention to resume normal activities.   Rehab potential is good.    Frequency:  1 X week, once daily  Duration:  for 4 weeks tapering to 2 X a month over 4 weeks  Discharge Plan:  Achieve all LTG.  Independent in home treatment program.  Reach maximal therapeutic benefit.    Please refer to the daily flowsheet for treatment today, total treatment time and time spent performing 1:1 timed codes.

## 2021-09-07 ENCOUNTER — TELEPHONE (OUTPATIENT)
Dept: ORTHOPEDICS | Facility: CLINIC | Age: 36
End: 2021-09-07

## 2021-09-07 NOTE — TELEPHONE ENCOUNTER
Tried reaching out to patient to discuss appointment on 9/11/21 with Dr Yeo, phone call was screened and unable to leave voice message.  Dr Yeo is currently out on SAM[facility].  Patient may reschedule with Dr Mendoza.       Glen Rhodes ATC

## 2021-09-13 ENCOUNTER — THERAPY VISIT (OUTPATIENT)
Dept: PHYSICAL THERAPY | Facility: CLINIC | Age: 36
End: 2021-09-13
Payer: COMMERCIAL

## 2021-09-13 DIAGNOSIS — M25.512 ACUTE PAIN OF LEFT SHOULDER: ICD-10-CM

## 2021-09-13 PROCEDURE — 97110 THERAPEUTIC EXERCISES: CPT | Mod: GP | Performed by: PHYSICAL THERAPIST

## 2021-09-20 ENCOUNTER — THERAPY VISIT (OUTPATIENT)
Dept: PHYSICAL THERAPY | Facility: CLINIC | Age: 36
End: 2021-09-20
Payer: COMMERCIAL

## 2021-09-20 DIAGNOSIS — M25.512 ACUTE PAIN OF LEFT SHOULDER: ICD-10-CM

## 2021-09-20 PROCEDURE — 97110 THERAPEUTIC EXERCISES: CPT | Mod: GP | Performed by: PHYSICAL THERAPIST

## 2021-09-20 PROCEDURE — 97112 NEUROMUSCULAR REEDUCATION: CPT | Mod: GP | Performed by: PHYSICAL THERAPIST

## 2021-09-20 PROCEDURE — 97530 THERAPEUTIC ACTIVITIES: CPT | Mod: GP | Performed by: PHYSICAL THERAPIST

## 2021-10-03 ENCOUNTER — HEALTH MAINTENANCE LETTER (OUTPATIENT)
Age: 36
End: 2021-10-03

## 2021-10-05 ENCOUNTER — THERAPY VISIT (OUTPATIENT)
Dept: PHYSICAL THERAPY | Facility: CLINIC | Age: 36
End: 2021-10-05
Payer: COMMERCIAL

## 2021-10-05 DIAGNOSIS — M25.512 ACUTE PAIN OF LEFT SHOULDER: ICD-10-CM

## 2021-10-05 PROCEDURE — 97110 THERAPEUTIC EXERCISES: CPT | Mod: GP | Performed by: PHYSICAL THERAPIST

## 2021-10-05 NOTE — PROGRESS NOTES
DISCHARGE  REPORT    Progress reporting period is from August 30, 2021 to Oct 5, 2021.         SUBJECTIVE  Subjective: Things have been going well. Not having any pain with any activities. Reaching, lifting and sleeping are all back to normal.   Current Pain level: 0/10.     Previous pain level was  6/10 Initial Pain level: 6/10.   Changes in function:  Yes (See Goal flowsheet attached for changes in current functional level)  Adverse reaction to treatment or activity: None    OBJECTIVE  Changes noted in objective findings:  Yes  L shoulder AROM: Flexion - 173, Abduction - 165. No pain at end range, just feels a stretch. Neers and Jacklynkin's negative on L. Patient was able to perform motions and loading akin to climbing a ladder in clinic today with no symptoms.        ASSESSMENT/PLAN  Diagnosis 1:  L shoulder strain  STG/LTGs have been met or progress has been made towards goals:  Yes (See Goal flow sheet completed today.)  Assessment of Progress: The patient's condition is improving.  Self Management Plans:  Patient has been instructed in a home treatment program.  Patient is independent in a home treatment program.  Patient  has been instructed in self management of symptoms.  Patient is independent in self management of symptoms.  I have re-evaluated this patient and find that the nature, scope, duration and intensity of the therapy is appropriate for the medical condition of the patient.  Valdez continues to require the following intervention to meet STG and LTG's:  PT intervention is no longer required to meet STG/LTG.    Recommendations:  This patient is ready to be discharged from therapy and continue their home treatment program.    Please refer to the daily flowsheet for treatment today, total treatment time and time spent performing 1:1 timed codes.

## 2021-11-16 PROBLEM — M25.512 LEFT SHOULDER PAIN: Status: RESOLVED | Noted: 2021-08-30 | Resolved: 2021-11-16

## 2022-05-15 ENCOUNTER — HEALTH MAINTENANCE LETTER (OUTPATIENT)
Age: 37
End: 2022-05-15

## 2022-09-11 ENCOUNTER — HEALTH MAINTENANCE LETTER (OUTPATIENT)
Age: 37
End: 2022-09-11

## 2022-11-21 ENCOUNTER — E-VISIT (OUTPATIENT)
Dept: INTERNAL MEDICINE | Facility: CLINIC | Age: 37
End: 2022-11-21
Payer: COMMERCIAL

## 2022-11-21 DIAGNOSIS — R42 VERTIGO: Primary | ICD-10-CM

## 2022-11-21 PROCEDURE — 99207 PR NON-BILLABLE SERV PER CHARTING: CPT | Performed by: INTERNAL MEDICINE

## 2022-11-22 NOTE — PATIENT INSTRUCTIONS
Thank you for choosing us for your care. I think an in-person visit (urgent care or clinic visit) would be best next steps based on your symptoms.  There are too many things that need a physical exam when it comes to vertigo to be able to do this via an asynchronous form of visit like an E-visit.     Please schedule a clinic appointment; you won t be charged for this eVisit.      You can schedule an appointment right here in [facility], or call [phone]

## 2023-06-03 ENCOUNTER — HEALTH MAINTENANCE LETTER (OUTPATIENT)
Age: 38
End: 2023-06-03

## 2023-12-27 ENCOUNTER — OFFICE VISIT (OUTPATIENT)
Dept: FAMILY MEDICINE | Facility: CLINIC | Age: 38
End: 2023-12-27
Payer: COMMERCIAL

## 2023-12-27 VITALS
DIASTOLIC BLOOD PRESSURE: 80 MMHG | TEMPERATURE: 96.3 F | RESPIRATION RATE: 10 BRPM | WEIGHT: 283 LBS | OXYGEN SATURATION: 97 % | SYSTOLIC BLOOD PRESSURE: 130 MMHG | HEART RATE: 84 BPM | BODY MASS INDEX: 38.33 KG/M2 | HEIGHT: 72 IN

## 2023-12-27 DIAGNOSIS — F33.1 MAJOR DEPRESSIVE DISORDER, RECURRENT EPISODE, MODERATE (H): ICD-10-CM

## 2023-12-27 DIAGNOSIS — L08.9 INFECTED SEBACEOUS CYST OF SKIN: Primary | ICD-10-CM

## 2023-12-27 DIAGNOSIS — L72.3 INFECTED SEBACEOUS CYST OF SKIN: Primary | ICD-10-CM

## 2023-12-27 DIAGNOSIS — F41.1 GENERALIZED ANXIETY DISORDER: ICD-10-CM

## 2023-12-27 PROCEDURE — 99213 OFFICE O/P EST LOW 20 MIN: CPT | Performed by: PHYSICIAN ASSISTANT

## 2023-12-27 ASSESSMENT — PATIENT HEALTH QUESTIONNAIRE - PHQ9
SUM OF ALL RESPONSES TO PHQ QUESTIONS 1-9: 8
SUM OF ALL RESPONSES TO PHQ QUESTIONS 1-9: 8
10. IF YOU CHECKED OFF ANY PROBLEMS, HOW DIFFICULT HAVE THESE PROBLEMS MADE IT FOR YOU TO DO YOUR WORK, TAKE CARE OF THINGS AT HOME, OR GET ALONG WITH OTHER PEOPLE: SOMEWHAT DIFFICULT

## 2023-12-27 NOTE — PROGRESS NOTES
Assessment & Plan     Infected sebaceous cyst of skin  Resolved with antibiotic regimen.  No pain.  No residual cyst appreciated on exam today.  Future preventative and treatment strategies discussed with patient.    Major depressive disorder, recurrent episode, moderate (H)  Generalized anxiety disorder  Managed by PCP.  Not currently on any medications      Review of prior external note(s) from - CareEverywhere information from [facility] reviewed     MED REC REQUIRED  Post Medication Reconciliation Status:  Discharge medications reconciled, continue medications without change  BMI:   Estimated body mass index is 38.38 kg/m  as calculated from the following:    Height as of this encounter: 1.829 m (6').    Weight as of this encounter: 128.4 kg (283 lb).   Weight management plan: Patient was referred to their PCP to discuss a diet and exercise plan.    Return in about 3 months (around 3/27/2024) for Physical Exam, Fasting labs.      Henna Blake PA-C  [facility]    Nayan Kellogg is a 38 year old, presenting for the following health issues:  ER F/U        12/27/2023     6:57 AM   Additional Questions   Roomed by Bharati LYNNE   Accompanied by Self       History of Present Illness       Reason for visit:  Cyst on waistline  Symptom onset:  3-4 weeks ago  Symptoms include:  2 cysts, 1 ruptured needed antibiotics  Symptom intensity:  Mild  Symptom progression:  Improving  Had these symptoms before:  No    He eats 2-3 servings of fruits and vegetables daily.He consumes 0 sweetened beverage(s) daily.He exercises with enough effort to increase his heart rate 10 to 19 minutes per day.  He exercises with enough effort to increase his heart rate 3 or less days per week.   He is taking medications regularly.       ED/UC Followup:    Facility:  [facility]  Date of visit: 12/01/2023 and 12/07/2023  Reason for visit: Infection sebaceous Cyst of skin on right lower  abdomen-[facility] 12/1/2023.  Treated with cephalexin/and topical Bactroban    Return to [facility] urgent [facility] 12/7/2023 due to lesion beginning to drain no ID felt to be needed.  -Recommended addition of doxycycline to cover for MRSA.  12/6/2023.  Cellulitis -- waistline - right side 2 spots - one ruptured  Current Status: Swelling has decreased - one is still there -- no longer painful. No ointments  - finished antibiotic just covering      Review of Systems   Constitutional, HEENT, cardiovascular, pulmonary, gi and gu systems are negative, except as otherwise noted.      Objective    /80 (BP Location: Right arm, Patient Position: Chair, Cuff Size: Adult Large)   Pulse 84   Temp (!) 96.3  F (35.7  C) (Tympanic)   Resp 10   Ht 1.829 m (6')   Wt 128.4 kg (283 lb)   SpO2 97%   BMI 38.38 kg/m    Body mass index is 38.38 kg/m .  Physical Exam   GENERAL: healthy, alert and no distress  EYES: Eyes grossly normal to inspection  MS: no gross musculoskeletal defects noted, no edema  SKIN: Scarred area along beltline of right lower abdomen without any appreciable residual cyst.  No tenderness, warmth, or drainage.  NEURO: Normal strength and tone, mentation intact and speech normal  PSYCH: mentation appears normal, affect normal/bright    No results found for any visits on 12/27/23.

## 2024-07-07 ENCOUNTER — HEALTH MAINTENANCE LETTER (OUTPATIENT)
Age: 39
End: 2024-07-07

## 2025-05-27 ENCOUNTER — OFFICE VISIT (OUTPATIENT)
Dept: FAMILY MEDICINE | Facility: CLINIC | Age: 40
End: 2025-05-27
Payer: COMMERCIAL

## 2025-05-27 VITALS
SYSTOLIC BLOOD PRESSURE: 134 MMHG | TEMPERATURE: 98 F | HEART RATE: 58 BPM | OXYGEN SATURATION: 99 % | HEIGHT: 72 IN | DIASTOLIC BLOOD PRESSURE: 84 MMHG | RESPIRATION RATE: 18 BRPM | WEIGHT: 266.9 LBS | BODY MASS INDEX: 36.15 KG/M2

## 2025-05-27 DIAGNOSIS — Z82.49 FAMILY HISTORY OF ISCHEMIC HEART DISEASE: ICD-10-CM

## 2025-05-27 DIAGNOSIS — Z13.1 SCREENING FOR DIABETES MELLITUS: ICD-10-CM

## 2025-05-27 DIAGNOSIS — R07.9 CHEST PAIN, UNSPECIFIED TYPE: ICD-10-CM

## 2025-05-27 DIAGNOSIS — Z82.49 FAMILIAL AORTIC ANEURYSM: ICD-10-CM

## 2025-05-27 DIAGNOSIS — Z00.00 HEALTHCARE MAINTENANCE: Primary | ICD-10-CM

## 2025-05-27 PROBLEM — F33.1 MAJOR DEPRESSIVE DISORDER, RECURRENT EPISODE, MODERATE (H): Status: RESOLVED | Noted: 2018-03-23 | Resolved: 2025-05-27

## 2025-05-27 LAB
ERYTHROCYTE [DISTWIDTH] IN BLOOD BY AUTOMATED COUNT: 12.8 % (ref 10–15)
HCT VFR BLD AUTO: 42.2 % (ref 40–53)
HGB BLD-MCNC: 15.2 G/DL (ref 13.3–17.7)
MCH RBC QN AUTO: 31.1 PG (ref 26.5–33)
MCHC RBC AUTO-ENTMCNC: 36 G/DL (ref 31.5–36.5)
MCV RBC AUTO: 87 FL (ref 78–100)
PLATELET # BLD AUTO: 275 10E3/UL (ref 150–450)
RBC # BLD AUTO: 4.88 10E6/UL (ref 4.4–5.9)
WBC # BLD AUTO: 9.6 10E3/UL (ref 4–11)

## 2025-05-27 PROCEDURE — 80053 COMPREHEN METABOLIC PANEL: CPT | Performed by: FAMILY MEDICINE

## 2025-05-27 PROCEDURE — 36415 COLL VENOUS BLD VENIPUNCTURE: CPT | Performed by: FAMILY MEDICINE

## 2025-05-27 PROCEDURE — 99395 PREV VISIT EST AGE 18-39: CPT | Performed by: FAMILY MEDICINE

## 2025-05-27 PROCEDURE — 3075F SYST BP GE 130 - 139MM HG: CPT | Performed by: FAMILY MEDICINE

## 2025-05-27 PROCEDURE — 3079F DIAST BP 80-89 MM HG: CPT | Performed by: FAMILY MEDICINE

## 2025-05-27 PROCEDURE — 80061 LIPID PANEL: CPT | Performed by: FAMILY MEDICINE

## 2025-05-27 PROCEDURE — 1126F AMNT PAIN NOTED NONE PRSNT: CPT | Performed by: FAMILY MEDICINE

## 2025-05-27 PROCEDURE — 85027 COMPLETE CBC AUTOMATED: CPT | Performed by: FAMILY MEDICINE

## 2025-05-27 SDOH — HEALTH STABILITY: PHYSICAL HEALTH: ON AVERAGE, HOW MANY DAYS PER WEEK DO YOU ENGAGE IN MODERATE TO STRENUOUS EXERCISE (LIKE A BRISK WALK)?: 2 DAYS

## 2025-05-27 SDOH — HEALTH STABILITY: PHYSICAL HEALTH: ON AVERAGE, HOW MANY MINUTES DO YOU ENGAGE IN EXERCISE AT THIS LEVEL?: 30 MIN

## 2025-05-27 ASSESSMENT — SOCIAL DETERMINANTS OF HEALTH (SDOH): HOW OFTEN DO YOU GET TOGETHER WITH FRIENDS OR RELATIVES?: ONCE A WEEK

## 2025-05-27 ASSESSMENT — PAIN SCALES - GENERAL: PAINLEVEL_OUTOF10: NO PAIN (0)

## 2025-05-27 ASSESSMENT — PATIENT HEALTH QUESTIONNAIRE - PHQ9
10. IF YOU CHECKED OFF ANY PROBLEMS, HOW DIFFICULT HAVE THESE PROBLEMS MADE IT FOR YOU TO DO YOUR WORK, TAKE CARE OF THINGS AT HOME, OR GET ALONG WITH OTHER PEOPLE: SOMEWHAT DIFFICULT
SUM OF ALL RESPONSES TO PHQ QUESTIONS 1-9: 8
SUM OF ALL RESPONSES TO PHQ QUESTIONS 1-9: 8

## 2025-05-27 NOTE — PROGRESS NOTES
"Preventive Care Visit  [facility] DOUGLAS Roberts MD, Family Medicine  May 27, 2025      Assessment & Plan     Screening for diabetes mellitus    - Comprehensive metabolic panel (BMP + Alb, Alk Phos, ALT, AST, Total. Bili, TP); Future  - Comprehensive metabolic panel (BMP + Alb, Alk Phos, ALT, AST, Total. Bili, TP)    Healthcare maintenance    - CBC with platelets; Future  - Comprehensive metabolic panel (BMP + Alb, Alk Phos, ALT, AST, Total. Bili, TP); Future  - Lipid panel reflex to direct LDL Fasting; Future  - CBC with platelets  - Comprehensive metabolic panel (BMP + Alb, Alk Phos, ALT, AST, Total. Bili, TP)  - Lipid panel reflex to direct LDL Fasting    Family history of ischemic heart disease  Has intermittently CP, and has family H/O aortic aneurysm and CAD  Will have him to check on echocardiogram for further evaluation, will consider referral to genetic counselor if indicated     Familial aortic aneurysm  Mentioned above     Chest pain, unspecified type  Mentioned above   - Comprehensive metabolic panel (BMP + Alb, Alk Phos, ALT, AST, Total. Bili, TP); Future  - Lipid panel reflex to direct LDL Fasting; Future  - Echocardiogram Complete; Future  - Comprehensive metabolic panel (BMP + Alb, Alk Phos, ALT, AST, Total. Bili, TP)  - Lipid panel reflex to direct LDL Fasting    Patient has been advised of split billing requirements and indicates understanding: Yes        BMI  Estimated body mass index is 36.55 kg/m  as calculated from the following:    Height as of this encounter: 1.82 m (5' 11.65\").    Weight as of this encounter: 121.1 kg (266 lb 14.4 oz).   Weight management plan: Discussed healthy diet and exercise guidelines    Counseling  Appropriate preventive services were addressed with this patient via screening, questionnaire, or discussion as appropriate for fall prevention, nutrition, physical activity, Tobacco-use cessation, social engagement, weight loss and cognition.  " Checklist reviewing preventive services available has been given to the patient.  Reviewed patient's diet, addressing concerns and/or questions.   He is at risk for lack of exercise and has been provided with information to increase physical activity for the benefit of his well-being.   He is at risk for psychosocial distress and has been provided with information to reduce risk.   The patient's PHQ-9 score is consistent with mild depression. He was provided with information regarding depression.           Nayan Kellogg is a 40 year old, presenting for the following:  Physical (Not fasting )        5/27/2025     2:51 PM   Additional Questions   Roomed by Yandel          [facility]       Advance Care Planning    Discussed advance care planning with patient; however, patient declined at this time.        5/27/2025   General Health   How would you rate your overall physical health? (!) FAIR   Feel stress (tense, anxious, or unable to sleep) To some extent   (!) STRESS CONCERN      5/27/2025   Nutrition   Three or more servings of calcium each day? Yes   Diet: Regular (no restrictions)   How many servings of fruit and vegetables per day? (!) 2-3   How many sweetened beverages each day? 0-1         5/27/2025   Exercise   Days per week of moderate/strenous exercise 2 days   Average minutes spent exercising at this level 30 min   (!) EXERCISE CONCERN      5/27/2025   Social Factors   Frequency of gathering with friends or relatives Once a week   Worry food won't last until get money to buy more No   Food not last or not have enough money for food? No   Do you have housing? (Housing is defined as stable permanent housing and does not include staying outside in a car, in a tent, in an abandoned building, in an overnight shelter, or couch-surfing.) No   Are you worried about losing your housing? No   Lack of transportation? No   Unable to get utilities (heat,electricity)? No   Want help with housing or utility concern? No    (!) HOUSING CONCERN PRESENT      5/27/2025   Dental   Dentist two times every year? Yes       Today's PHQ-9 Score:       5/27/2025    10:56 AM   PHQ-9 SCORE   PHQ-9 Total Score MyChart 8 (Mild depression)   PHQ-9 Total Score 8        Patient-reported         5/27/2025   Substance Use   Alcohol more than 3/day or more than 7/wk No   Do you use any other substances recreationally? No     Social History     Tobacco Use    Smoking status: Never     Passive exposure: Never    Smokeless tobacco: Never   Vaping Use    Vaping status: Never Used   Substance Use Topics    Alcohol use: Not Currently    Drug use: Never             5/27/2025   One time HIV Screening   Previous HIV test? Yes         5/27/2025   STI Screening   New sexual partner(s) since last STI/HIV test? No         5/27/2025   Contraception/Family Planning   Questions about contraception or family planning No        Reviewed and updated as needed this visit by Provider                    Past Medical History:   Diagnosis Date    Depressive disorder 2014     No past surgical history on file.  Labs reviewed in EPIC  BP Readings from Last 3 Encounters:   05/27/25 134/84   12/27/23 130/80   08/17/21 130/76    Wt Readings from Last 3 Encounters:   05/27/25 121.1 kg (266 lb 14.4 oz)   12/27/23 128.4 kg (283 lb)   08/17/21 115 kg (253 lb 9.6 oz)                  Patient Active Problem List   Diagnosis    Generalized anxiety disorder     No past surgical history on file.    Social History     Tobacco Use    Smoking status: Never     Passive exposure: Never    Smokeless tobacco: Never   Substance Use Topics    Alcohol use: Not Currently     Family History   Problem Relation Age of Onset    Breast Cancer Mother         in remission    Other Cancer Mother         Breat, Thyroid    Myocardial Infarction Father     Coronary Artery Disease Father     Obesity Father     Aneurysm Paternal Grandfather 36    Coronary Artery Disease Paternal Grandfather     Obesity Maternal  "Grandfather     Other Cancer Maternal Grandmother     Diabetes Other         Uncle    Depression Other         Uncle    Coronary Artery Disease Other     Obesity Other     Obesity Other          No current outpatient medications on file.     No Known Allergies  Recent Labs   Lab Test 03/23/18  0753   *   HDL 32*   TRIG 231*               Review of Systems  Constitutional, HEENT, cardiovascular, pulmonary, GI, , musculoskeletal, neuro, skin, endocrine and psych systems are negative, except as otherwise noted.     Objective    Exam  /84 (BP Location: Left arm, Patient Position: Sitting, Cuff Size: Adult Large)   Pulse 58   Temp 98  F (36.7  C) (Temporal)   Resp 18   Ht 1.82 m (5' 11.65\")   Wt 121.1 kg (266 lb 14.4 oz)   SpO2 99%   BMI 36.55 kg/m     Estimated body mass index is 36.55 kg/m  as calculated from the following:    Height as of this encounter: 1.82 m (5' 11.65\").    Weight as of this encounter: 121.1 kg (266 lb 14.4 oz).    Physical Exam  GENERAL: alert and no distress  EYES: Eyes grossly normal to inspection, PERRL and conjunctivae and sclerae normal  HENT: ear canals and TM's normal, nose and mouth without ulcers or lesions  NECK: no adenopathy, no asymmetry, masses, or scars  RESP: lungs clear to auscultation - no rales, rhonchi or wheezes  CV: regular rate and rhythm, normal S1 S2, no S3 or S4, no murmur, click or rub, no peripheral edema  ABDOMEN: soft, nontender, no hepatosplenomegaly, no masses and bowel sounds normal  MS: no gross musculoskeletal defects noted, no edema  SKIN: no suspicious lesions or rashes  NEURO: Normal strength and tone, mentation intact and speech normal  BACK: no CVA tenderness, no paralumbar tenderness  PSYCH: mentation appears normal, affect normal/bright  LYMPH: no cervical, supraclavicular, axillary, or inguinal adenopathy        Signed Electronically by: Gasper Roberts MD    "

## 2025-05-28 ENCOUNTER — RESULTS FOLLOW-UP (OUTPATIENT)
Dept: FAMILY MEDICINE | Facility: CLINIC | Age: 40
End: 2025-05-28

## 2025-05-28 LAB
ALBUMIN SERPL BCG-MCNC: 4.3 G/DL (ref 3.5–5.2)
ALP SERPL-CCNC: 89 U/L (ref 40–150)
ALT SERPL W P-5'-P-CCNC: 57 U/L (ref 0–70)
ANION GAP SERPL CALCULATED.3IONS-SCNC: 9 MMOL/L (ref 7–15)
AST SERPL W P-5'-P-CCNC: 30 U/L (ref 0–45)
BILIRUB SERPL-MCNC: 0.4 MG/DL
BUN SERPL-MCNC: 18 MG/DL (ref 6–20)
CALCIUM SERPL-MCNC: 9.2 MG/DL (ref 8.8–10.4)
CHLORIDE SERPL-SCNC: 104 MMOL/L (ref 98–107)
CHOLEST SERPL-MCNC: 192 MG/DL
CREAT SERPL-MCNC: 1.29 MG/DL (ref 0.67–1.17)
EGFRCR SERPLBLD CKD-EPI 2021: 72 ML/MIN/1.73M2
FASTING STATUS PATIENT QL REPORTED: NO
FASTING STATUS PATIENT QL REPORTED: NO
GLUCOSE SERPL-MCNC: 98 MG/DL (ref 70–99)
HCO3 SERPL-SCNC: 26 MMOL/L (ref 22–29)
HDLC SERPL-MCNC: 34 MG/DL
LDLC SERPL CALC-MCNC: 101 MG/DL
NONHDLC SERPL-MCNC: 158 MG/DL
POTASSIUM SERPL-SCNC: 4 MMOL/L (ref 3.4–5.3)
PROT SERPL-MCNC: 7.1 G/DL (ref 6.4–8.3)
SODIUM SERPL-SCNC: 139 MMOL/L (ref 135–145)
TRIGL SERPL-MCNC: 283 MG/DL